# Patient Record
Sex: FEMALE | Race: BLACK OR AFRICAN AMERICAN | NOT HISPANIC OR LATINO | Employment: PART TIME | URBAN - METROPOLITAN AREA
[De-identification: names, ages, dates, MRNs, and addresses within clinical notes are randomized per-mention and may not be internally consistent; named-entity substitution may affect disease eponyms.]

---

## 2018-07-30 VITALS
RESPIRATION RATE: 20 BRPM | HEART RATE: 83 BPM | BODY MASS INDEX: 39.65 KG/M2 | HEIGHT: 71 IN | TEMPERATURE: 98 F | OXYGEN SATURATION: 100 % | WEIGHT: 283.19 LBS | SYSTOLIC BLOOD PRESSURE: 144 MMHG | DIASTOLIC BLOOD PRESSURE: 76 MMHG

## 2018-07-30 PROCEDURE — 99283 EMERGENCY DEPT VISIT LOW MDM: CPT

## 2018-07-31 ENCOUNTER — HOSPITAL ENCOUNTER (EMERGENCY)
Facility: HOSPITAL | Age: 33
Discharge: HOME | End: 2018-07-31
Attending: EMERGENCY MEDICINE
Payer: MEDICAID

## 2018-07-31 DIAGNOSIS — M25.561 KNEE PAIN, RIGHT: ICD-10-CM

## 2018-07-31 RX ORDER — DICLOFENAC SODIUM 50 MG/1
50 TABLET, DELAYED RELEASE ORAL 2 TIMES DAILY
Qty: 15 TABLET | Refills: 0 | Status: SHIPPED | OUTPATIENT
Start: 2018-07-31 | End: 2018-08-29
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
## 2018-08-29 ENCOUNTER — HOSPITAL ENCOUNTER (EMERGENCY)
Facility: HOSPITAL | Age: 33
Discharge: HOME | End: 2018-08-29
Attending: EMERGENCY MEDICINE
Payer: MEDICAID

## 2018-08-29 VITALS
BODY MASS INDEX: 40.13 KG/M2 | HEIGHT: 71 IN | SYSTOLIC BLOOD PRESSURE: 159 MMHG | WEIGHT: 286.63 LBS | DIASTOLIC BLOOD PRESSURE: 74 MMHG | OXYGEN SATURATION: 100 % | TEMPERATURE: 98 F | RESPIRATION RATE: 18 BRPM | HEART RATE: 88 BPM

## 2018-08-29 DIAGNOSIS — R03.0 ELEVATED BLOOD PRESSURE READING: ICD-10-CM

## 2018-08-29 DIAGNOSIS — S93.402A SPRAIN OF LEFT ANKLE, UNSPECIFIED LIGAMENT, INITIAL ENCOUNTER: Primary | ICD-10-CM

## 2018-08-29 DIAGNOSIS — S99.912A LEFT ANKLE INJURY: ICD-10-CM

## 2018-08-29 DIAGNOSIS — E66.01 MORBIDLY OBESE: ICD-10-CM

## 2018-08-29 PROCEDURE — 99283 EMERGENCY DEPT VISIT LOW MDM: CPT

## 2018-08-29 PROCEDURE — 25000003 PHARM REV CODE 250: Performed by: REGISTERED NURSE

## 2018-08-29 RX ORDER — TRAMADOL HYDROCHLORIDE 50 MG/1
50 TABLET ORAL
Status: COMPLETED | OUTPATIENT
Start: 2018-08-29 | End: 2018-08-29

## 2018-08-29 RX ORDER — TRAMADOL HYDROCHLORIDE 50 MG/1
50 TABLET ORAL EVERY 6 HOURS PRN
Qty: 12 TABLET | Refills: 0 | Status: SHIPPED | OUTPATIENT
Start: 2018-08-29 | End: 2018-09-08

## 2018-08-29 RX ORDER — IBUPROFEN 800 MG/1
800 TABLET ORAL EVERY 6 HOURS PRN
Qty: 20 TABLET | Refills: 0 | Status: SHIPPED | OUTPATIENT
Start: 2018-08-29 | End: 2020-04-21

## 2018-08-29 RX ADMIN — TRAMADOL HYDROCHLORIDE 50 MG: 50 TABLET, COATED ORAL at 06:08

## 2018-08-29 NOTE — ED PROVIDER NOTES
"SCRIBE #1 NOTE: I, Rachel Mendozao, am scribing for, and in the presence of, Zoltan Brown NP. I have scribed the entire note.      History      Chief Complaint   Patient presents with    Ankle Pain     Pt twisted L ankle in "hole" yesterday. Increased pain and swelling per patient. Pt states unable to put weight on it.        Review of patient's allergies indicates:   Allergen Reactions    Naproxen Nausea Only        HPI   HPI    8/29/2018, 6:31 PM   History obtained from the patient      History of Present Illness: Bonnie Matias is a 32 y.o. female patient who presents to the Emergency Department for L ankle pain which onset gradually yesterday. Patient reports stepping into a hole yesterday and twisted her L ankle. Symptoms are constant and moderate in severity. No mitigating or exacerbating factors reported. Associated sxs include swelling to L ankle. Patient denies any other injury, fever, chills, L foot pain, decreased ROM of L ankle, increased warmth/redness to L ankle, extremity weakness/numbness/tingling, and all other sxs at this time. No further complaints or concerns at this time.     Arrival mode: Personal vehicle      PCP: Primary Doctor No       Past Medical History:  Past Medical History:   Diagnosis Date    Diabetes mellitus        Past Surgical History:  History reviewed. No pertinent surgical history.      Family History:  History reviewed. No pertinent family history.    Social History:  Social History     Tobacco Use    Smoking status: Current Every Day Smoker     Packs/day: 0.50     Types: Cigarettes   Substance and Sexual Activity    Alcohol use: No    Drug use: No    Sexual activity: Unknown       ROS   Review of Systems   Constitutional: Negative for chills and fever.   HENT: Negative for sore throat.    Respiratory: Negative for shortness of breath.    Cardiovascular: Negative for chest pain.   Gastrointestinal: Negative for nausea.   Genitourinary: Negative for dysuria. "   Musculoskeletal: Negative for back pain.        (+) L ankle pain, swelling to L ankle  (-) decreased ROM of L ankle, L foot pain   Skin: Negative for rash.        (-) increased warmth/redness to L ankle   Neurological: Negative for weakness and numbness.        (-) extremity tingling   Hematological: Does not bruise/bleed easily.   All other systems reviewed and are negative.      Physical Exam      Initial Vitals [08/29/18 1812]   BP Pulse Resp Temp SpO2   (!) 159/74 95 18 97.7 °F (36.5 °C) 97 %      MAP       --          Physical Exam  Nursing Notes and Vital Signs Reviewed.  Constitutional: Patient is in no acute distress. Obese.  Head: Atraumatic. Normocephalic.  Eyes: PERRL. EOM intact. Conjunctivae are not pale. No scleral icterus.  ENT: Mucous membranes are moist. Oropharynx is clear and symmetric.    Neck: Supple. Full ROM. No lymphadenopathy.  Cardiovascular: Regular rate. Regular rhythm. No murmurs, rubs, or gallops. Distal pulses are 2+ and symmetric.  Pulmonary/Chest: No respiratory distress. Clear to auscultation bilaterally. No wheezing or rales.  Abdominal: Soft and non-distended.  There is no tenderness.  No rebound, guarding, or rigidity. Good bowel sounds.  Genitourinary: No CVA tenderness  Musculoskeletal: Moves all extremities. No obvious deformities. No edema. No calf tenderness.  Left Ankle:  No obvious deformity. Tenderness to palpation over lateral aspect of L foot. Mild swelling and tenderness to palpation over L lateral malleolus. No bony tenderness over navicular.  No tenderness over the base of the 5th metatarsal.  No proximal fibular tenderness.  Ankle dorsiflexion and ankle plantar flexion are intact.  Intact sensation to light touch. Distal capillary refill takes less than 2 seconds.  PT and DP pulses are 2+ bilaterally.  Skin: Warm and dry.  Neurological:  Alert, awake, and appropriate.  Normal speech.  No acute focal neurological deficits are appreciated.  Psychiatric: Normal  "affect. Good eye contact. Appropriate in content.    ED Course    Orthopedic Injury  Date/Time: 8/29/2018 7:03 PM  Performed by: KIN Ya Jr.  Authorized by: KIN Ya Jr.     Consent Done?:  Yes  Universal Protocol:     Verbal consent obtained?: Yes      Risks and benefits: Risks, benefits and alternatives were discussed      Consent given by:  Patient  Injury:     Injury location:  Ankle    Location details:  Left ankle    Injury type:  Soft tissue      Pre-procedure assessment:     Neurovascular status: Neurovascularly intact      Distal perfusion: normal      Neurological function: normal      Range of motion: reduced        Selections made in this section will also lock the Injury type section above.:     Immobilization:  Crutches    Splint type: ACE wrap.    Complications: No    Post-procedure assessment:     Neurovascular status: Neurovascularly intact      Distal perfusion: normal      Neurological function: normal      Range of motion: normal      Patient tolerance:  Patient tolerated the procedure well with no immediate complications      ED Vital Signs:  Vitals:    08/29/18 1812 08/29/18 1921   BP: (!) 159/74 (!) 159/74   Pulse: 95 88   Resp: 18 18   Temp: 97.7 °F (36.5 °C)    TempSrc: Oral    SpO2: 97% 100%   Weight: 130 kg (286 lb 9.6 oz)    Height: 5' 11" (1.803 m)        Imaging Results:  Imaging Results          X-Ray Ankle Complete Left (Final result)  Result time 08/29/18 18:50:11    Final result by DIMITRI Vora Sr., MD (08/29/18 18:50:11)                 Impression:      Normal study.      Electronically signed by: Jimmie Vora MD  Date:    08/29/2018  Time:    18:50             Narrative:    EXAMINATION:  XR ANKLE COMPLETE 3 VIEW LEFT    CLINICAL HISTORY:  Unspecified injury of left ankle, initial encounter    COMPARISON:  None    FINDINGS:  There is no fracture. There is no dislocation.                                        The Emergency Provider reviewed " the vital signs and test results, which are outlined above.    ED Discussion     7:04 PM: Reassessed pt at this time. Discussed with pt all pertinent ED information and results. Discussed pt dx and plan of tx. Gave pt all f/u and return to the ED instructions. All questions and concerns were addressed at this time. Pt expresses understanding of information and instructions, and is comfortable with plan to discharge. Pt is stable for discharge.    I discussed with patient and/or family/caretaker that evaluation in the ED does not suggest any emergent or life threatening medical conditions requiring immediate intervention beyond what was provided in the ED, and I believe patient is safe for discharge.  Regardless, an unremarkable evaluation in the ED does not preclude the development or presence of a serious of life threatening condition. As such, patient was instructed to return immediately for any worsening or change in current symptoms.    Pre-hypertension/Hypertension: The pt has been informed that they may have pre-hypertension or hypertension based on a blood pressure reading in the ED. I recommend that the pt call the PCP listed on their discharge instructions or a physician of their choice this week to arrange f/u for further evaluation of possible pre-hypertension or hypertension.         ED Medication(s):  Medications   traMADol tablet 50 mg (50 mg Oral Given 8/29/18 1845)       Discharge Medication List as of 8/29/2018  7:11 PM      START taking these medications    Details   ibuprofen (ADVIL,MOTRIN) 800 MG tablet Take 1 tablet (800 mg total) by mouth every 6 (six) hours as needed for Pain., Starting Wed 8/29/2018, Print      traMADol (ULTRAM) 50 mg tablet Take 1 tablet (50 mg total) by mouth every 6 (six) hours as needed for Pain., Starting Wed 8/29/2018, Until Sat 9/8/2018, Print             Follow-up Information     Primary Doctor No In 1 week.                   Medical Decision Making    Medical  Decision Making:   Clinical Tests:   Radiological Study: Ordered and Reviewed           Scribe Attestation:   Scribe #1: I performed the above scribed service and the documentation accurately describes the services I performed. I attest to the accuracy of the note.    Attending:   Physician Attestation Statement for Scribe #1: I, Zoltan Brown NP, personally performed the services described in this documentation, as scribed by Rachel Franco, in my presence, and it is both accurate and complete.          Clinical Impression       ICD-10-CM ICD-9-CM   1. Sprain of left ankle, unspecified ligament, initial encounter S93.402A 845.00   2. Left ankle injury S99.912A 959.7   3. Elevated blood pressure reading R03.0 796.2   4. Morbidly obese E66.01 278.01       Disposition:   Disposition: Discharged  Condition: Stable         Zoltan Brown Jr., [facility]  08/29/18 2053

## 2019-11-04 ENCOUNTER — OFFICE VISIT (OUTPATIENT)
Dept: INTERNAL MEDICINE | Facility: CLINIC | Age: 34
End: 2019-11-04
Payer: MEDICAID

## 2019-11-04 VITALS
OXYGEN SATURATION: 98 % | HEIGHT: 71 IN | HEART RATE: 76 BPM | SYSTOLIC BLOOD PRESSURE: 144 MMHG | DIASTOLIC BLOOD PRESSURE: 86 MMHG | WEIGHT: 283.94 LBS | BODY MASS INDEX: 39.75 KG/M2 | TEMPERATURE: 98 F

## 2019-11-04 DIAGNOSIS — F31.31 BIPOLAR AFFECTIVE DISORDER, CURRENTLY DEPRESSED, MILD: ICD-10-CM

## 2019-11-04 DIAGNOSIS — E66.01 MORBID OBESITY WITH BMI OF 40.0-44.9, ADULT: ICD-10-CM

## 2019-11-04 DIAGNOSIS — I10 ESSENTIAL HYPERTENSION: ICD-10-CM

## 2019-11-04 DIAGNOSIS — Z00.00 ENCOUNTER FOR WELLNESS EXAMINATION: ICD-10-CM

## 2019-11-04 DIAGNOSIS — E11.9 TYPE 2 DIABETES MELLITUS WITHOUT COMPLICATION, WITHOUT LONG-TERM CURRENT USE OF INSULIN: Primary | ICD-10-CM

## 2019-11-04 PROBLEM — F32.A DEPRESSION: Status: ACTIVE | Noted: 2018-08-20

## 2019-11-04 PROBLEM — Z63.4 DEATH OF LIFE PARTNER: Status: ACTIVE | Noted: 2018-09-24

## 2019-11-04 PROCEDURE — 99999 PR PBB SHADOW E&M-EST. PATIENT-LVL IV: CPT | Mod: PBBFAC,,, | Performed by: FAMILY MEDICINE

## 2019-11-04 PROCEDURE — 99999 PR PBB SHADOW E&M-EST. PATIENT-LVL IV: ICD-10-PCS | Mod: PBBFAC,,, | Performed by: FAMILY MEDICINE

## 2019-11-04 PROCEDURE — 99214 OFFICE O/P EST MOD 30 MIN: CPT | Mod: PBBFAC | Performed by: FAMILY MEDICINE

## 2019-11-04 PROCEDURE — 99204 OFFICE O/P NEW MOD 45 MIN: CPT | Mod: S$PBB,,, | Performed by: FAMILY MEDICINE

## 2019-11-04 PROCEDURE — 99204 PR OFFICE/OUTPT VISIT, NEW, LEVL IV, 45-59 MIN: ICD-10-PCS | Mod: S$PBB,,, | Performed by: FAMILY MEDICINE

## 2019-11-04 RX ORDER — LOSARTAN POTASSIUM 50 MG/1
50 TABLET ORAL DAILY
Qty: 90 TABLET | Refills: 3 | Status: SHIPPED | OUTPATIENT
Start: 2019-11-04 | End: 2020-11-03

## 2019-11-04 RX ORDER — LURASIDONE HYDROCHLORIDE 40 MG/1
40 TABLET, FILM COATED ORAL DAILY
Qty: 90 TABLET | Refills: 4 | Status: SHIPPED | OUTPATIENT
Start: 2019-11-04 | End: 2020-04-21 | Stop reason: SDUPTHER

## 2019-11-04 RX ORDER — QUETIAPINE FUMARATE 100 MG/1
200 TABLET, FILM COATED ORAL DAILY
Qty: 90 TABLET | Refills: 4 | Status: SHIPPED | OUTPATIENT
Start: 2019-11-04 | End: 2020-06-22

## 2019-11-04 RX ORDER — METFORMIN HYDROCHLORIDE 500 MG/1
1000 TABLET, EXTENDED RELEASE ORAL 2 TIMES DAILY WITH MEALS
Qty: 360 TABLET | Refills: 3 | Status: SHIPPED | OUTPATIENT
Start: 2019-11-04 | End: 2020-11-26

## 2019-11-04 RX ORDER — ATORVASTATIN CALCIUM 20 MG/1
20 TABLET, FILM COATED ORAL DAILY
Qty: 90 TABLET | Refills: 3 | Status: SHIPPED | OUTPATIENT
Start: 2019-11-04 | End: 2020-11-26

## 2019-11-04 RX ORDER — ASPIRIN 81 MG/1
81 TABLET ORAL DAILY
Refills: 0 | COMMUNITY
Start: 2019-11-04 | End: 2020-11-03

## 2019-11-04 NOTE — PATIENT INSTRUCTIONS
Pneumonia 23, tetanus, and flu vaccines are recommended at our pharmacy downstairs.       How to Quit Smoking  Smoking is one of the hardest habits to break. About half of all people who have ever smoked have been able to quit. Most people who still smoke want to quit. Here are some of the best ways to stop smoking.    Keep trying  Most smokers make many attempts at quitting before they are successful. Its important not to give up.  Go cold turkey  Most former smokers quit cold turkey (all at once). Trying to cut back gradually doesn't seem to work as well, perhaps because it continues the smoking habit. Also, it is possible to inhale more while smoking fewer cigarettes. This results in the same amount of nicotine in your body.  Get support  Support programs can be a big help, especially for heavy smokers. These groups offer lectures, ways to change behavior, and peer support. Here are some ways to find a support program:  · Free national quitline: 800-QUIT-NOW (591-032-0036).  · Hospital quit-smoking programs.  · American Lung Association: (670.457.1277).  · American Cancer Society (092-092-6607).  Support at home is important too. Nonsmokers can offer praise and encouragement. If the smoker in your life finds it hard to quit, encourage them to keep trying.  Over-the-counter medicines  Nicotine replacement therapy may make quitting easier. Certain aids, such as the nicotine patch, gum, and lozenges, are available without a prescription. It is best to use these under a doctors care, though. The skin patch provides a steady supply of nicotine. Nicotine gum and lozenges give temporary bursts of low levels of nicotine. Both methods reduce the craving for cigarettes. Warning: If you have nausea, vomiting, dizziness, weakness, or a fast heartbeat, stop using these products and see your doctor.  Prescription medicines  After reviewing your smoking patterns and past attempts to quit, your doctor may offer a prescription  "medicine such as bupropion, varenicline, a nicotine inhaler, or nasal spray. Each has advantages and side effects. Your doctor can review these with you.  Health benefits of quitting  The benefits of quitting start right away and keep improving the longer you go without smoking. These benefits occur at any age.  So whether you are 17 or 70, quitting is a good decision. Some of the benefits include:  · 20 minutes: Blood pressure and pulse return to normal.  · 8 hours: Oxygen levels return to normal.  · 2 days: Ability to smell and taste begin to improve as damaged nerves regrow.  · 2 to 3 weeks: Circulation and lung function improve.  · 1 to 9 months: Coughing, congestion, and shortness of breath decrease; tiredness decreases.  · 1 year: Risk of heart attack decreases by half.  · 5 years: Risk of lung cancer decreases by half; risk of stroke becomes the same as a nonsmokers.  For more on how to quit smoking, try these online resources:   · Smokefree.gov  · "Clearing the Air" booklet from the National Cancer [place]: smokefree.gov/sites/default/files/pdf/clearing-the-air-accessible.pdf  Date Last Reviewed: 3/1/2017  © 0151-8478 [employer]. [address] [place] [address], [place] [address]. All rights reserved. This information is not intended as a substitute for professional medical care. Always follow your healthcare professional's instructions.        "

## 2019-11-04 NOTE — PROGRESS NOTES
Subjective:       Patient ID: Bonnie Matias is a 33 y.o. female.    Chief Complaint: Establish Care    32 yo female here to establish care. She has a h/o biploar disorder but has been off of her medication for about 10 months. She was seeing a psychiatrist but no longer has access to psychiatric care. She had an ER visit with psych hospitalization in 1/2019. She had a 90 day prescription then for her antipsychotic medication but none since. She has h/o DM2 and was on metformin but has not had any lately.     does not have any pertinent problems on file.  Past Medical History:   Diagnosis Date    Diabetes mellitus      Past Surgical History:   Procedure Laterality Date    ARTHROSCOPIC RECONSTRUCTION OF POSTEROLATERAL CORNER OF KNEE       Family History   Problem Relation Age of Onset    Hypertension Mother     Diabetes Father     Hypertension Father     Heart disease Father      Social History     Socioeconomic History    Marital status: Single     Spouse name: Not on file    Number of children: Not on file    Years of education: Not on file    Highest education level: Not on file   Occupational History    Not on file   Social Needs    Financial resource strain: Not on file    Food insecurity:     Worry: Not on file     Inability: Not on file    Transportation needs:     Medical: Not on file     Non-medical: Not on file   Tobacco Use    Smoking status: Current Every Day Smoker     Packs/day: 0.50     Types: Cigarettes   Substance and Sexual Activity    Alcohol use: No    Drug use: No    Sexual activity: Not on file   Lifestyle    Physical activity:     Days per week: Not on file     Minutes per session: Not on file    Stress: Not on file   Relationships    Social connections:     Talks on phone: Not on file     Gets together: Not on file     Attends Rastafarian service: Not on file     Active member of club or organization: Not on file     Attends meetings of clubs or organizations: Not on  file     Relationship status: Not on file   Other Topics Concern    Not on file   Social History Narrative    Not on file     Review of Systems   Constitutional: Negative for fatigue and unexpected weight change.   HENT: Negative for hearing loss and sore throat.    Eyes: Negative for pain and visual disturbance.   Respiratory: Negative for cough and shortness of breath.    Cardiovascular: Negative for chest pain and palpitations.   Gastrointestinal: Negative for abdominal pain, constipation and diarrhea.   Endocrine: Negative for polydipsia, polyphagia and polyuria.   Genitourinary: Negative for difficulty urinating, dysuria and vaginal discharge.   Musculoskeletal: Negative for arthralgias and myalgias.   Skin: Negative for rash and wound.   Neurological: Negative for light-headedness and headaches.   Hematological: Negative.    Psychiatric/Behavioral: Positive for dysphoric mood and sleep disturbance. Negative for confusion, hallucinations, self-injury and suicidal ideas.       Objective:     Vitals:    11/04/19 1134   BP: (!) 144/86   Pulse: 76   Temp: 97.6 °F (36.4 °C)        Physical Exam   Constitutional: She is oriented to person, place, and time. She appears well-developed and well-nourished. No distress.   Obese   HENT:   Head: Normocephalic and atraumatic.   Eyes: Pupils are equal, round, and reactive to light. EOM are normal.   Neck: Normal range of motion. Neck supple.   Cardiovascular: Normal rate and regular rhythm.   Pulmonary/Chest: Effort normal and breath sounds normal.   Abdominal: Soft. Bowel sounds are normal.   Musculoskeletal: Normal range of motion. She exhibits no deformity.   Neurological: She is alert and oriented to person, place, and time.   Skin: Skin is warm and dry. She is not diaphoretic.   Psychiatric: She has a normal mood and affect. Her behavior is normal.   Appropriate, calm   Nursing note and vitals reviewed.      Assessment:       1. Type 2 diabetes mellitus without  complication, without long-term current use of insulin    2. Bipolar affective disorder, currently depressed, mild    3. Encounter for wellness examination    4. Essential hypertension    5. Morbid obesity with BMI of 40.0-44.9, adult        Plan:           Problem List Items Addressed This Visit        Psychiatric    Bipolar affective disorder, currently depressed, mild    Relevant Medications    QUEtiapine (SEROQUEL) 100 MG Tab    lurasidone (LATUDA) 40 mg Tab tablet       Cardiac/Vascular    Essential hypertension    Relevant Medications    losartan (COZAAR) 50 MG tablet       Endocrine    Type 2 diabetes mellitus without complication, without long-term current use of insulin - Primary    Relevant Medications    metFORMIN (GLUCOPHAGE-XR) 500 MG 24 hr tablet    losartan (COZAAR) 50 MG tablet    atorvastatin (LIPITOR) 20 MG tablet    aspirin (ECOTRIN) 81 MG EC tablet    Other Relevant Orders    Ambulatory Referral to Optometry    Hemoglobin A1c    Lipid panel    Basic metabolic panel    Morbid obesity with BMI of 40.0-44.9, adult    Overview     Counseled today.  Limited economic resources affect food choices.  Suggestions for healthier eating discussed.           Other Visit Diagnoses     Encounter for wellness examination        Relevant Orders    Ambulatory Referral to Gynecology

## 2019-11-04 NOTE — LETTER
November 6, 2019      [facility]  [address] [address]           [facility]  [address] [address] [address]  Phone: [phone]  Fax: [phone]          Patient: Bonnie Matias   MR Number: [id number]   YOB: 1985   Date of Visit: 11/4/2019       Dear [facility]:    Thank you for referring Bonnie Matias to me for evaluation. Attached you will find relevant portions of my assessment and plan of care.    If you have questions, please do not hesitate to call me. I look forward to following Bonnie Matias along with you.    Sincerely,    Simi Veloz MD    Enclosure  CC:  No Recipients    If you would like to receive this communication electronically, please contact [email or web address] or [phone] to request more information on [employer] access.    For providers and/or their staff who would like to refer a patient to [facility], please contact us through our one-stop-shop provider referral line, Kevin Schroeder, at [phone].    If you feel you have received this communication in error or would no longer like to receive these types of communications, please e-mail [email or web address]

## 2019-11-06 PROBLEM — E11.9 TYPE 2 DIABETES MELLITUS WITHOUT COMPLICATION, WITHOUT LONG-TERM CURRENT USE OF INSULIN: Status: ACTIVE | Noted: 2019-11-06

## 2019-11-06 PROBLEM — I10 ESSENTIAL HYPERTENSION: Status: ACTIVE | Noted: 2019-11-06

## 2019-11-06 PROBLEM — E66.01 MORBID OBESITY WITH BMI OF 40.0-44.9, ADULT: Status: ACTIVE | Noted: 2019-11-06

## 2020-04-20 ENCOUNTER — PATIENT MESSAGE (OUTPATIENT)
Dept: INTERNAL MEDICINE | Facility: CLINIC | Age: 35
End: 2020-04-20

## 2020-04-21 ENCOUNTER — TELEPHONE (OUTPATIENT)
Dept: INTERNAL MEDICINE | Facility: CLINIC | Age: 35
End: 2020-04-21

## 2020-04-21 ENCOUNTER — OFFICE VISIT (OUTPATIENT)
Dept: INTERNAL MEDICINE | Facility: CLINIC | Age: 35
End: 2020-04-21
Payer: MEDICAID

## 2020-04-21 DIAGNOSIS — F19.10 POLYSUBSTANCE ABUSE: ICD-10-CM

## 2020-04-21 DIAGNOSIS — N91.2 AMENORRHEA: Primary | ICD-10-CM

## 2020-04-21 DIAGNOSIS — Z91.199 NON-COMPLIANCE: ICD-10-CM

## 2020-04-21 DIAGNOSIS — F31.31 BIPOLAR AFFECTIVE DISORDER, CURRENTLY DEPRESSED, MILD: ICD-10-CM
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
## 2020-04-21 DIAGNOSIS — E11.9 TYPE 2 DIABETES MELLITUS WITHOUT COMPLICATION, WITHOUT LONG-TERM CURRENT USE OF INSULIN: ICD-10-CM
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
## 2020-04-21 PROCEDURE — 99214 OFFICE O/P EST MOD 30 MIN: CPT | Mod: 95,,, | Performed by: FAMILY MEDICINE

## 2020-04-21 PROCEDURE — 99214 PR OFFICE/OUTPT VISIT, EST, LEVL IV, 30-39 MIN: ICD-10-PCS | Mod: 95,,, | Performed by: FAMILY MEDICINE

## 2020-04-21 RX ORDER — LURASIDONE HYDROCHLORIDE 40 MG/1
40 TABLET, FILM COATED ORAL DAILY
Qty: 90 TABLET | Refills: 4 | Status: SHIPPED | OUTPATIENT
Start: 2020-04-21 | End: 2020-04-21

## 2020-04-21 RX ORDER — LURASIDONE HYDROCHLORIDE 80 MG/1
80 TABLET, FILM COATED ORAL DAILY
Qty: 30 TABLET | Refills: 11 | Status: SHIPPED | OUTPATIENT
Start: 2020-04-21 | End: 2020-11-26

## 2020-04-21 NOTE — TELEPHONE ENCOUNTER
----- Message from Simi Veloz MD sent at 4/21/2020 10:08 AM CDT -----  Please fax psychiatry referral to [facility]; Please also coordinate getting patient into the lab for her blood work (ordered 11/19) and UPT.

## 2020-04-21 NOTE — ASSESSMENT & PLAN NOTE
Implanted Type Area  Device Identifier Shelf Expiration Date Model / Serial / Lot   Right 10 Hole Superior Midshaft Plate  Implanted: Qty: 1 on 03/10/2017 by Esteban Marie MD at [facility]     [employer]     [id number] /  /   3.5x16 Bone Screw  Implanted: Qty: 3 on 03/10/2017 by Esteban Marie MD at [facility]     [employer]     [id number] /  /   3.5x20 Bone Screw  Implanted: Qty: 1 on 03/10/2017 by Esteban Marie MD at [facility]     [employer]     [id number] /  /   3.5x14 Locking Screw  Implanted: Qty: 1 on 03/10/2017 by Esteban Marie MD at [facility]     [employer]     [id number] /  /   3.5x16 Locking Screw  Implanted: Qty: 1 on 03/10/2017 by Esteban Marie MD at [facility]     [employer]     [id number] /  /   3.5x24 Locking Screw  Implanted: Qty: 2 on 03/10/2017 by Esteban Marie MD at [facility]     [employer]     [id number] /  /     Explanted Type Area  Device Identifier Shelf Expiration Date Model / Serial / Lot   3.5x14 Locking Screw  Explanted: Qty: 2 on 03/10/2017 by Esteban Marie MD at [facility]     [employer]     [id number] /  /

## 2020-04-21 NOTE — ASSESSMENT & PLAN NOTE
Never had establish care labs drawn from 11/2019; will attempt to get her in for labs and UPT today

## 2020-04-21 NOTE — PATIENT INSTRUCTIONS
"  Bipolar Disorder  Bipolar disorder is an illness that causes strong mood swings between depression and marin. It used to be called "manic depression." The mood swings are different from the normal ups and downs we all normally experience in our lives. They are more severe, last longer, and can interfere with work and relationships. These episodes are changes from our usual moods and behavior. Their severity can be mild, or drastic and explosive.  · In a manic episode, you may think fast and do things quickly. It may seem like you are getting a lot done. At first, this may feel very good. But in the extreme this can lead to a lifestyle that is disorganized, chaotic, and includes risky behavior (spending sprees, sexual acting-out, or drug use). In later stages, it may affect eating (no interest in food) and sleeping (unable to sleep for days at a time). Speech may speed up and become difficult for others to understand. You may appear to others as if you are in your own world.  · In a depressive episode, you may feel a lack of interest in normal activities. Sometimes there is sadness or guilt without any clear reason. Thinking may become slow and there can be a lack energy or feeling of hopelessness. Some people have thoughts of harming themselves at this stage. Thoughts can even turn to suicide.  Between these phases you may actually feel OK. This does not mean that the illness is gone. People with this disorder will usually have to treat it all of their life. Medicine and good care can greatly reduce the symptoms.  The exact cause of this illness is unknown. However, there is a genetic link that makes a person more likely to get this problem. Also, the use of drugs such as speed (amphetamine) and cocaine increase the chances of this illness appearing.  Home care  · On-going care and support helps people manage this disease.  Find a healthcare provider and therapist who meet your needs.  Seek help when you feel " like you may be heading into either a manic episode or a depressive state.  · Be sure to take your medicine and get regular blood work to check your the levels of medicine in your body.  Take the medicine and get the follow-up lab work as prescribed, even if you think you dont need to do it.  · Be certain to tell each of your healthcare providers about all of the prescription drugs, over-the-counter medicines, and supplements you take. Certain supplements interact with medicines and result in dangerous side effects.  You can also use your pharmacist as a resource person when you have questions about drug interactions.  · Talk with your family and trusted friends about your thoughts and feelings.  Ask them to help you recognize behavior changes early so you can get help and medicines can be adjusted.  · Alcohol and drugs can bring on an episode, and also make them worse  · If your life is severely impacted by this illness, the Americans with Disabilities Act (ADA) may provide help. The ADA protects people with chronic physical and mental health problems. If you are having trouble keeping jobs, managing workplace issues, or caring for yourself because of your bipolar disorder, contact your local ADA office to see if they can help. The [place] Department  of Justice operates a toll-free ADA information line at: 797.649.5705 (Voice); or 331-420-7972 (TTY).  They can help you locate a local office.    Follow-up care  Follow up with your doctor or therapist as advised. They can help you to find ways to improve your life.  Call 911  Call your healthcare provider right away if any of these occur:  · You have suicidal thoughts, a plan, and the means to  harm yourself  · Trouble breathing  · Confusion  · Drowsiness or trouble wakening  · Fainting or loss of consciousness  · Rapid heart rate, very low heart rate, or a new irregular heart rate  · Seizure  · New chest pain that becomes more severe, lasts longer, or spreads into your  shoulder, arm, neck, jaw, or back  When to seek medical advice  Call your healthcare provider right away if any of these occur:  · Feeling like your symptoms are getting worse (depression, agitation, excess energy)  · Unable to eat or sleep for more than 48 hours  · Feeling out of control (racing thoughts, poor concentration)  · Feeling like you want to harm yourself or another  · Being unable to care for yourself  Date Last Reviewed: 9/29/2015 © 2000-2017 [employer]. [address] [place] [address], [place] [address]. All rights reserved. This information is not intended as a substitute for professional medical care. Always follow your healthcare professional's instructions.
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
## 2020-04-22 ENCOUNTER — PATIENT MESSAGE (OUTPATIENT)
Dept: INTERNAL MEDICINE | Facility: CLINIC | Age: 35
End: 2020-04-22

## 2020-04-28 ENCOUNTER — TELEPHONE (OUTPATIENT)
Dept: PHARMACY | Facility: CLINIC | Age: 35
End: 2020-04-28

## 2020-04-28 ENCOUNTER — PATIENT MESSAGE (OUTPATIENT)
Dept: PHARMACY | Facility: CLINIC | Age: 35
End: 2020-04-28

## 2020-04-28 NOTE — TELEPHONE ENCOUNTER
The prior authorization for Bonnie Matias's LATUDA 80MG has been submitted on 4/28/2020 @ 2:19PM.  It can take up to 72 hours for a decision to be rendered from the insurance.  Patient is unaware of PA submission, voicemail unavailable.  Will send patient portal message.  Please let me know if you have any questions.    Thank You!   Halina Bobby CPhT, B.A  Patient Care Advocate   [facility]  [email or web address]  Phone: [phone] Ext [phone]  Fax: [phone]

## 2020-05-04 ENCOUNTER — TELEPHONE (OUTPATIENT)
Dept: PHARMACY | Facility: CLINIC | Age: 35
End: 2020-05-04

## 2020-05-04 NOTE — TELEPHONE ENCOUNTER
Good Afternoon,     The prior authorization for Bonnie Matias's LATUDA 80MG prescription has been APPROVED FROM 5/4/2020 TO 5/4/2021 with copayment of $0.0.       We were unable to reach patient on 5/4/2020.  A voicemail was left for the patient of the prior authorization status along with an appropriate pharmacy phone number should he/she have questions.     I will also send the patient a patient portal message.    If there are any additional questions or concerns, please contact me.    Thank You!   Halina Bobby CPhT, B.A  Patient Care Advocate   [facility]  [email or web address]  Phone: [phone] Ext [phone]  Fax: [phone]

## 2020-05-06 ENCOUNTER — PATIENT MESSAGE (OUTPATIENT)
Dept: INTERNAL MEDICINE | Facility: CLINIC | Age: 35
End: 2020-05-06

## 2020-05-06 ENCOUNTER — TELEPHONE (OUTPATIENT)
Dept: INTERNAL MEDICINE | Facility: CLINIC | Age: 35
End: 2020-05-06

## 2020-05-06 NOTE — TELEPHONE ENCOUNTER
Pt called and stated she was seen in the ER last night 5/5/20 because of a allergic reaction to Latuda and needs to change medications. She can be reached at [phone].

## 2020-05-06 NOTE — TELEPHONE ENCOUNTER
Please advise her to stop Latuda completely and see a psychiatrist for further psych med management. Please provide info on Medicaid providers--Latisha Lima, [place], [facility]

## 2020-10-13 ENCOUNTER — PATIENT MESSAGE (OUTPATIENT)
Dept: PALLIATIVE MEDICINE | Facility: CLINIC | Age: 35
End: 2020-10-13

## 2020-10-14 ENCOUNTER — TELEPHONE (OUTPATIENT)
Dept: INTERNAL MEDICINE | Facility: CLINIC | Age: 35
End: 2020-10-14

## 2020-10-14 ENCOUNTER — TELEPHONE (OUTPATIENT)
Dept: PALLIATIVE MEDICINE | Facility: CLINIC | Age: 35
End: 2020-10-14

## 2020-10-14 NOTE — TELEPHONE ENCOUNTER
Spoke with patient she wanted to set up PCP appt informed her that Dr. Veloz switched to Palliative care. Patient requested a PCP in [facility]. Nurse educated on how to set up appt for a provider in her area through my chart sumaya. Verbalized understanding

## 2020-10-14 NOTE — TELEPHONE ENCOUNTER
----- Message from Bianka Vazquez sent at 10/14/2020  9:09 AM CDT -----  Regarding: my chart request  Appointment Request From: Emilia Cui    With Provider: Leeanna Braun MD [[facility]]    Preferred Date Range: Any    Preferred Times: Any Time    Reason for visit: Office Visit    Comments:  I was referred by GYN doctor to see my PCP due high blood pressure 140/85.  Please contact me at [phone].  Thanks

## 2020-10-28 ENCOUNTER — DOCUMENTATION ONLY (OUTPATIENT)
Dept: INTERNAL MEDICINE | Facility: CLINIC | Age: 35
End: 2020-10-28

## 2020-11-26 ENCOUNTER — HOSPITAL ENCOUNTER (EMERGENCY)
Facility: HOSPITAL | Age: 35
Discharge: HOME | End: 2020-11-26
Attending: FAMILY MEDICINE
Payer: MEDICAID

## 2020-11-26 VITALS
WEIGHT: 280.88 LBS | DIASTOLIC BLOOD PRESSURE: 77 MMHG | HEIGHT: 71 IN | SYSTOLIC BLOOD PRESSURE: 160 MMHG | RESPIRATION RATE: 18 BRPM | BODY MASS INDEX: 39.32 KG/M2 | TEMPERATURE: 98 F | HEART RATE: 88 BPM | OXYGEN SATURATION: 99 %

## 2020-11-26 DIAGNOSIS — Z46.89 ENCOUNTER FOR MANAGEMENT OF WOUND VAC: Primary | ICD-10-CM

## 2020-11-26 DIAGNOSIS — G89.18 POST-OP PAIN: ICD-10-CM

## 2020-11-26 PROCEDURE — 99283 EMERGENCY DEPT VISIT LOW MDM: CPT | Mod: 25

## 2020-11-26 PROCEDURE — 82962 GLUCOSE BLOOD TEST: CPT

## 2020-11-26 PROCEDURE — 25000003 PHARM REV CODE 250: Performed by: FAMILY MEDICINE

## 2020-11-26 RX ORDER — OXYCODONE AND ACETAMINOPHEN 10; 325 MG/1; MG/1
1 TABLET ORAL
Status: COMPLETED | OUTPATIENT
Start: 2020-11-26 | End: 2020-11-26

## 2020-11-26 RX ORDER — POLYETHYLENE GLYCOL 3350 17 G/17G
17 POWDER, FOR SOLUTION ORAL DAILY
Qty: 1 BOTTLE | Refills: 0 | Status: SHIPPED | OUTPATIENT
Start: 2020-11-26 | End: 2020-12-03

## 2020-11-26 RX ADMIN — OXYCODONE HYDROCHLORIDE AND ACETAMINOPHEN 1 TABLET: 10; 325 TABLET ORAL at 05:11
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
## 2020-11-27 LAB — POCT GLUCOSE: 105 MG/DL (ref 70–110)

## 2020-11-27 NOTE — DISCHARGE INSTRUCTIONS
Please keep your wound care appt on Monday. CALL wound care tomorrow AM AND call your surgeon. If you have worsening pain, fever, not passing GAS for 12-24 hours, come back to ED. Take stool softners as prescribed and drink a lot of water.    Regarding CONSTIPATION, I informed patient of common causes of constipation (low-fiber diet, lack of physical activity, decreased fluid intake, and delays in going to the bathroom when urge is present) and ways to prevent it (eat lots of fiber, drink plenty of fluids daily, exercise regularly, and go to the bathroom when you urge presents.  For treatment, advised patient to use OTC medications:  stool softeners (docusate sodium), bulk laxatives (psyllium) to help add fluid and bulk to the stool, suppositories or gentle laxatives (milk of magnesia liquid), and to reserve enemas or stimulant laxatives for severe cases only. Reiterated the importance of following up with primary care provider for management of their constipation.    I discussed wound care precautions; specifically, that all wounds have risk of infection despite efforts to cleanse and debride the wound; and there is a risk of an occult foreign body (and thus increased risk of infection) despite a negative examination.  I discussed with patient need to return for any signs of infection, specifically redness, increased pain, fever, drainage of pus, or any concern, immediately.
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
## 2021-04-28 ENCOUNTER — PATIENT MESSAGE (OUTPATIENT)
Dept: RESEARCH | Facility: HOSPITAL | Age: 36
End: 2021-04-28